# Patient Record
Sex: MALE | Race: WHITE
[De-identification: names, ages, dates, MRNs, and addresses within clinical notes are randomized per-mention and may not be internally consistent; named-entity substitution may affect disease eponyms.]

---

## 2023-11-14 ENCOUNTER — TRANSCRIPTION ENCOUNTER (OUTPATIENT)
Age: 63
End: 2023-11-14

## 2023-11-15 PROBLEM — Z00.00 ENCOUNTER FOR PREVENTIVE HEALTH EXAMINATION: Status: ACTIVE | Noted: 2023-11-15

## 2023-12-13 ENCOUNTER — APPOINTMENT (OUTPATIENT)
Dept: PULMONOLOGY | Facility: CLINIC | Age: 63
End: 2023-12-13
Payer: MEDICARE

## 2023-12-13 VITALS
BODY MASS INDEX: 24.62 KG/M2 | HEART RATE: 56 BPM | TEMPERATURE: 97.8 F | WEIGHT: 172 LBS | SYSTOLIC BLOOD PRESSURE: 102 MMHG | HEIGHT: 70 IN | OXYGEN SATURATION: 97 % | RESPIRATION RATE: 18 BRPM | DIASTOLIC BLOOD PRESSURE: 70 MMHG

## 2023-12-13 DIAGNOSIS — R91.8 OTHER NONSPECIFIC ABNORMAL FINDING OF LUNG FIELD: ICD-10-CM

## 2023-12-13 DIAGNOSIS — J43.2 CENTRILOBULAR EMPHYSEMA: ICD-10-CM

## 2023-12-13 DIAGNOSIS — Z87.891 PERSONAL HISTORY OF NICOTINE DEPENDENCE: ICD-10-CM

## 2023-12-13 DIAGNOSIS — D80.1 NONFAMILIAL HYPOGAMMAGLOBULINEMIA: ICD-10-CM

## 2023-12-13 DIAGNOSIS — R05.9 COUGH, UNSPECIFIED: ICD-10-CM

## 2023-12-13 LAB — HEMOGLOBIN: 13.2

## 2023-12-13 PROCEDURE — 99205 OFFICE O/P NEW HI 60 MIN: CPT | Mod: 25

## 2023-12-13 PROCEDURE — 85018 HEMOGLOBIN: CPT | Mod: QW

## 2023-12-13 PROCEDURE — 94010 BREATHING CAPACITY TEST: CPT

## 2023-12-13 PROCEDURE — 94726 PLETHYSMOGRAPHY LUNG VOLUMES: CPT

## 2023-12-13 PROCEDURE — 94729 DIFFUSING CAPACITY: CPT

## 2023-12-13 PROCEDURE — ZZZZZ: CPT

## 2023-12-13 RX ORDER — TAMSULOSIN HYDROCHLORIDE 0.4 MG/1
0.4 CAPSULE ORAL
Refills: 0 | Status: ACTIVE | COMMUNITY
Start: 2023-12-13

## 2023-12-13 RX ORDER — LEVOTHYROXINE SODIUM 100 UG/1
100 TABLET ORAL
Refills: 0 | Status: ACTIVE | COMMUNITY
Start: 2023-12-13

## 2023-12-13 RX ORDER — VITAMIN K2 90 MCG
1000 CAPSULE ORAL
Refills: 0 | Status: ACTIVE | COMMUNITY
Start: 2023-12-13

## 2023-12-13 RX ORDER — FINASTERIDE 5 MG/1
5 TABLET, FILM COATED ORAL
Refills: 0 | Status: ACTIVE | COMMUNITY
Start: 2023-12-13

## 2023-12-13 RX ORDER — FOLIC ACID 1 MG/1
1 TABLET ORAL
Refills: 0 | Status: ACTIVE | COMMUNITY
Start: 2023-12-13

## 2023-12-13 RX ORDER — ROSUVASTATIN CALCIUM 5 MG/1
5 TABLET, FILM COATED ORAL
Refills: 0 | Status: ACTIVE | COMMUNITY
Start: 2023-12-13

## 2023-12-13 RX ORDER — POTASSIUM CHLORIDE 1.5 G/1
20 POWDER, FOR SOLUTION ORAL
Refills: 0 | Status: ACTIVE | COMMUNITY
Start: 2023-12-13

## 2023-12-13 NOTE — PHYSICAL EXAM
[No Acute Distress] : no acute distress [Normal Oropharynx] : normal oropharynx [Normal Appearance] : normal appearance [No Neck Mass] : no neck mass [Normal Rate/Rhythm] : normal rate/rhythm [Normal S1, S2] : normal s1, s2 [No Murmurs] : no murmurs [No Resp Distress] : no resp distress [Clear to Auscultation Bilaterally] : clear to auscultation bilaterally [Normal to Percussion] : normal to percussion [No Abnormalities] : no abnormalities [Benign] : benign [Not Tender] : not tender [No HSM] : no hsm [Normal Gait] : normal gait [No Clubbing] : no clubbing [No Cyanosis] : no cyanosis [No Edema] : no edema [FROM] : FROM [Normal Color/ Pigmentation] : normal color/ pigmentation [No Focal Deficits] : no focal deficits [Oriented x3] : oriented x3 [Normal Affect] : normal affect

## 2023-12-13 NOTE — DISCUSSION/SUMMARY
[FreeTextEntry1] : COPD mild.  Predominantly of the emphysematous type.  Good air entry. Pulmonary nodules.  Subcentimeter. Renal abnormality cyst on ultrasound.

## 2023-12-13 NOTE — HISTORY OF PRESENT ILLNESS
[Former] : former [TextBox_4] : MIAN BELL is a 63 year old  M referred for pulmonary evaluation for  Saw PMD sent for LDCT and referred.  Denies cough, wheezing, CP or SOB. Positive rhinitis. Feeling well Wt. stable  Past pulmonary history. N Occupational Exposure.  and A/C Family history of pulmonary disease. Adopted Recent travel N Pets N  No children.  GUAN 5 flights. [TextBox_11] : 1 [TextBox_13] : 40 [YearQuit] : 2020

## 2023-12-13 NOTE — PROCEDURE
[FreeTextEntry1] : CAT scan of the chest of September 6, 2023 reviewed and discussed with patient. Mild emphysematous changes. Small pulmonary nodules No significant adenopathy. Calcified granuloma. Renal lesion.   12/13/2023 Pulmonary function testing FEV1, FVC, and FEV1/FVC are within normal limits. TLC is normal. FRC is increased. RV is normal. RV/TLC ratio is normal. Resistance and specific conductance are normal.  There is a borderline mild diffusion impairment.

## 2023-12-13 NOTE — CONSULT LETTER
[Dear  ___] : Dear ~DAVID, [Consult Letter:] : I had the pleasure of evaluating your patient, [unfilled]. [Consult Closing:] : Thank you very much for allowing me to participate in the care of this patient.  If you have any questions, please do not hesitate to contact me. [Sincerely,] : Sincerely, [FreeTextEntry2] : Jerry Davis MD [FreeTextEntry3] : [facility]

## 2023-12-13 NOTE — ASSESSMENT
[FreeTextEntry1] : Alpha-1 antitrypsin level. Will hold. Likely mild disease and no children.  Based upon clinical exam likely emphysema is mild and no indication for bronchodilator therapy. Urged not to restart smoking. Follow-up CT lung cancer screening in 1 year.  Task sent as reminder. Follow-up post CT or sooner on a as needed basis. No indication for bronchodilator therapy.

## 2024-09-25 ENCOUNTER — APPOINTMENT (OUTPATIENT)
Dept: PULMONOLOGY | Facility: CLINIC | Age: 64
End: 2024-09-25
Payer: MEDICARE

## 2024-09-25 VITALS
OXYGEN SATURATION: 98 % | TEMPERATURE: 97.6 F | SYSTOLIC BLOOD PRESSURE: 104 MMHG | DIASTOLIC BLOOD PRESSURE: 67 MMHG | WEIGHT: 173 LBS | HEIGHT: 70 IN | BODY MASS INDEX: 24.77 KG/M2 | HEART RATE: 64 BPM

## 2024-09-25 DIAGNOSIS — R91.8 OTHER NONSPECIFIC ABNORMAL FINDING OF LUNG FIELD: ICD-10-CM

## 2024-09-25 DIAGNOSIS — R05.9 COUGH, UNSPECIFIED: ICD-10-CM

## 2024-09-25 DIAGNOSIS — J43.2 CENTRILOBULAR EMPHYSEMA: ICD-10-CM

## 2024-09-25 LAB — HEMOGLOBIN: 11.7

## 2024-09-25 PROCEDURE — 94726 PLETHYSMOGRAPHY LUNG VOLUMES: CPT

## 2024-09-25 PROCEDURE — 94729 DIFFUSING CAPACITY: CPT

## 2024-09-25 PROCEDURE — 99213 OFFICE O/P EST LOW 20 MIN: CPT | Mod: 25

## 2024-09-25 PROCEDURE — 94010 BREATHING CAPACITY TEST: CPT

## 2024-09-25 PROCEDURE — 85018 HEMOGLOBIN: CPT | Mod: QW

## 2024-09-25 PROCEDURE — ZZZZZ: CPT

## 2024-09-25 NOTE — CONSULT LETTER
[Dear  ___] : Dear ~DAVID, [Consult Letter:] : I had the pleasure of evaluating your patient, [unfilled]. [Consult Closing:] : Thank you very much for allowing me to participate in the care of this patient.  If you have any questions, please do not hesitate to contact me. [Sincerely,] : Sincerely, [FreeTextEntry2] : Jerry Davis MD [FreeTextEntry3] : [facility]

## 2024-09-25 NOTE — ASSESSMENT
[FreeTextEntry1] : CT then F/u in 1 year.  Task sent as reminder. Had workup for renal cyst.  Follow-up post CT or sooner on a as needed basis. No indication for bronchodilator therapy.

## 2024-09-25 NOTE — DISCUSSION/SUMMARY
[FreeTextEntry1] : COPD mild.  Predominantly of the emphysematous type.  Good air entry.  Function stable. Pulmonary nodules.  Subcentimeter. Renal abnormality cyst on ultrasound.

## 2024-09-25 NOTE — PROCEDURE
[FreeTextEntry1] : 9/25/24 PFT FEV1, FVC, and FEV1/FVC are within normal limits. TLC is normal. FRC is increased. RV is normal. RV/TLC ratio is decreased. Resistance and specific conductance are normal. Single breath diffusion capacity is normal.  Mild increase in function compared to December 2023.   Ct chest 9/9/24.  Reviewed compared to prior and discussed. Stable pulmonary nodules.  Bullous changes.  No adenopathy.  No significant coronary calcifications.  Renal cyst.

## 2024-09-25 NOTE — HISTORY OF PRESENT ILLNESS
[Former] : former [TextBox_4] : Here for f/u after screening ct chest and PFT Denies cough, wheezing, CP or SOB. Positive rhinitis. Feeling well Wt. stable    [TextBox_11] : 1 [TextBox_13] : 40 [YearQuit] : 2020

## 2025-04-02 ENCOUNTER — APPOINTMENT (OUTPATIENT)
Dept: UROLOGY | Facility: CLINIC | Age: 65
End: 2025-04-02
Payer: MEDICARE

## 2025-04-02 ENCOUNTER — NON-APPOINTMENT (OUTPATIENT)
Age: 65
End: 2025-04-02

## 2025-04-02 VITALS
DIASTOLIC BLOOD PRESSURE: 71 MMHG | HEIGHT: 70 IN | SYSTOLIC BLOOD PRESSURE: 106 MMHG | WEIGHT: 175 LBS | HEART RATE: 60 BPM | BODY MASS INDEX: 25.05 KG/M2 | TEMPERATURE: 97.7 F | OXYGEN SATURATION: 98 %

## 2025-04-02 DIAGNOSIS — N13.8 BENIGN PROSTATIC HYPERPLASIA WITH LOWER URINARY TRACT SYMPMS: ICD-10-CM

## 2025-04-02 DIAGNOSIS — R97.20 ELEVATED PROSTATE, SPECIFIC ANTIGEN [PSA]: ICD-10-CM

## 2025-04-02 DIAGNOSIS — N40.1 BENIGN PROSTATIC HYPERPLASIA WITH LOWER URINARY TRACT SYMPMS: ICD-10-CM

## 2025-04-02 PROCEDURE — 99204 OFFICE O/P NEW MOD 45 MIN: CPT

## 2025-04-02 RX ORDER — FINASTERIDE 1 MG/1
TABLET ORAL
Refills: 0 | Status: ACTIVE | COMMUNITY

## 2025-04-02 RX ORDER — TAMSULOSIN HYDROCHLORIDE 0.4 MG/1
CAPSULE ORAL
Refills: 0 | Status: ACTIVE | COMMUNITY

## 2025-04-02 RX ORDER — CHOLECALCIFEROL (VITAMIN D3) 25 MCG
TABLET ORAL
Refills: 0 | Status: ACTIVE | COMMUNITY

## 2025-04-02 RX ORDER — ROSUVASTATIN CALCIUM 5 MG/1
TABLET, FILM COATED ORAL
Refills: 0 | Status: ACTIVE | COMMUNITY

## 2025-04-02 RX ORDER — POTASSIUM CHLORIDE 1500 MG/1
TABLET, EXTENDED RELEASE ORAL
Refills: 0 | Status: ACTIVE | COMMUNITY

## 2025-04-02 NOTE — ASSESSMENT
[FreeTextEntry1] : PETE ZHU  is a 64 year old man who presents for enlarged prostate, patient was following up at [facility] with Dr Torres, has BPH on tamsulosin and finasteride, patient has history of elevated PSA since 2015 (10s-20s), multiple biopsies done, 2 of them by Dr Torres 4/19, 10/17, took a course of antibiotics for 4 weeks in 12/19 (doxycline monohydrate). PSA trending down over the years last one in 12/24 4.98.  Last MRI done 11/17/22 negative with Dr Sarmiento  BPH - On tamsulosin and finasteride - IPSS 9, PVR 0ml - Follow up 6months  elevated PSA - Multiple biopsies negative - Inflammation - PSA decreased after antibiotics course in 2019 - follow up in 6 months  Follow up in 6months wiht Dr Torres  Thank you very much for allowing me to assist in the care of this patient. Please do not hesitate to contact me with any additional questions or concerns.     Sincerely,     Nelson Torres D.O. Professor of Urology and Radiology  of Urology at [facility] Director for Prostate Cancer [address] [facility], [facility] Phone: [phone]   I saw and examined/evaluated the patient with the resident ((Jahaira Cowan MD (PGY 6)) discussed w/ resident and agree with findings.

## 2025-04-02 NOTE — HISTORY OF PRESENT ILLNESS
[FreeTextEntry1] : Self Referred  Chief Complaint: Enlarged Prostate  Date of first visit: 04/02/2025  PETE ZHU  is a 64 year old man who presents for enlarged prostate, patient was following up at [facility] with Dr Torres, has BPH on tamsulosin and finasteride, patient has history of elevated PSA seince 2015 (10s-20s), multiple biopsies done, 2 of them by Dr Torres 4/19, 10/17, took a course of antibiotics for 4 weeks in 12/19 (doxycline monohydrate). PSA trending down over the years last one in 12/24 4.98.   Last MRI done 11/17/22 negative with Dr Sarmiento  PSA Hx 12/6/24 4.98 11/1/23 6.86 7/26/23 8.6  7/27/22 10.22 7/16/20 8.7 1/17/20 10.2 11/2/19 14.8 10/26/18 15.9 6/13/18 16.1 12/20/17 12.1 10/2/17 16.8 8/5/16 22.6 1/8/16 23.9 6/25/15 26.26 6.06 11/01/2023  MRI Hx MRI 11/22 no suspicious lesions MRI 1/18/19 no lesions MRI 7/30/17 PIRADS 3 lesion MRI 7/14/15 PIRADS 1, 110cc prostate  Biopsy Hx 2 of them by Dr Torres 4/19, 10/17 3/15 negative, acute and chronic inflammation  04/02/2025 IPSS 9  QOL 2 TIM  10  PVR 0ml   voided 60ml Qmax 6.6ml/s EPIC 26   The patient denies fevers, chills, nausea and or vomiting and no unexplained weight loss.  All pertinent laboratory, films and physician notes were reviewed.  Questionnaire results were discussed with patient.

## 2025-04-02 NOTE — ASSESSMENT
[FreeTextEntry1] : PETE ZHU  is a 64 year old man who presents for enlarged prostate, patient was following up at [facility] with Dr Torres, has BPH on tamsulosin and finasteride, patient has history of elevated PSA since 2015 (10s-20s), multiple biopsies done, 2 of them by Dr Torres 4/19, 10/17, took a course of antibiotics for 4 weeks in 12/19 (doxycline monohydrate). PSA trending down over the years last one in 12/24 4.98.  Last MRI done 11/17/22 negative with Dr Sarmiento  BPH - On tamsulosin and finasteride - IPSS 9, PVR 0ml - Follow up 6months  elevated PSA - Multiple biopsies negative - Inflammation - PSA decreased after antibiotics course in 2019 - follow up in 6 months  Follow up in 6months wiht Dr Torres  Thank you very much for allowing me to assist in the care of this patient. Please do not hesitate to contact me with any additional questions or concerns.     Sincerely,     Nelson Torres D.O. Professor of Urology and Radiology  of Urology at [facility] Director for Prostate Cancer [address] [facility], [facility] Phone: [phone]   I saw and examined/evaluated the patient with the resident ((Jahaira Cowan MD (PGY 6)) discussed w/ resident and agree with findings.

## 2025-04-02 NOTE — HISTORY OF PRESENT ILLNESS
[FreeTextEntry1] : Self Referred  Chief Complaint: Enlarged Prostate  Date of first visit: 04/02/2025  PETE ZHU  is a 64 year old man who presents for enlarged prostate, patient was following up at [facility] with Dr Torres, has BPH on tamsulosin and finasteride, patient has history of elevated PSA seince 2015 (10s-20s), multiple biopsies done, 2 of them by Dr Torres 4/19, 10/17, took a course of antibiotics for 4 weeks in 12/19 (doxycline monohydrate). PSA trending down over the years last one in 12/24 4.98.   Last MRI done 11/17/22 negative with Dr Sarmiento  PSA Hx 12/6/24 4.98 11/1/23 6.86 7/26/23 8.6  7/27/22 10.22 7/16/20 8.7 1/17/20 10.2 11/2/19 14.8 10/26/18 15.9 6/13/18 16.1 12/20/17 12.1 10/2/17 16.8 8/5/16 22.6 1/8/16 23.9 6/25/15 26.26 6.06 11/01/2023  MRI Hx MRI 11/22 no suspicious lesions MRI 1/18/19 no lesions MRI 7/30/17 PIRADS 3 lesion MRI 7/14/15 PIRADS 1, 110cc prostate  Biopsy Hx 2 of them by Dr Torres 4/19, 10/17 3/15 negative, acute and chronic inflammation  04/02/2025 IPSS 9  QOL 2 TIM  10  PVR 0ml   voided 60ml Qmax 6.6ml/s EPIC 26   The patient denies fevers, chills, nausea and or vomiting and no unexplained weight loss.  All pertinent laboratory, films and physician notes were reviewed.  Questionnaire results were discussed with patient.

## 2025-08-19 ENCOUNTER — NON-APPOINTMENT (OUTPATIENT)
Age: 65
End: 2025-08-19